# Patient Record
Sex: FEMALE | Race: BLACK OR AFRICAN AMERICAN | Employment: UNEMPLOYED | URBAN - METROPOLITAN AREA
[De-identification: names, ages, dates, MRNs, and addresses within clinical notes are randomized per-mention and may not be internally consistent; named-entity substitution may affect disease eponyms.]

---

## 2022-12-26 ENCOUNTER — HOSPITAL ENCOUNTER (EMERGENCY)
Age: 32
Discharge: LEFT BEFORE DISCHARGE | End: 2022-12-26
Attending: EMERGENCY MEDICINE

## 2022-12-26 VITALS
HEART RATE: 71 BPM | SYSTOLIC BLOOD PRESSURE: 137 MMHG | DIASTOLIC BLOOD PRESSURE: 85 MMHG | RESPIRATION RATE: 18 BRPM | TEMPERATURE: 97.8 F | OXYGEN SATURATION: 99 %

## 2022-12-26 PROCEDURE — 75810000275 HC EMERGENCY DEPT VISIT NO LEVEL OF CARE

## 2022-12-26 NOTE — ED TRIAGE NOTES
TRIAGE NOTE:  Patient arrives ambulatory with c/o right eye redness on Friday and reports was having pain in right eye yesterday.

## 2023-02-28 ENCOUNTER — HOSPITAL ENCOUNTER (EMERGENCY)
Age: 33
Discharge: HOME | End: 2023-02-28
Attending: EMERGENCY MEDICINE
Payer: MEDICAID

## 2023-02-28 ENCOUNTER — APPOINTMENT (OUTPATIENT)
Dept: CT IMAGING | Age: 33
End: 2023-02-28
Attending: NURSE PRACTITIONER
Payer: MEDICAID

## 2023-02-28 VITALS
HEART RATE: 97 BPM | DIASTOLIC BLOOD PRESSURE: 80 MMHG | OXYGEN SATURATION: 98 % | TEMPERATURE: 98.7 F | SYSTOLIC BLOOD PRESSURE: 124 MMHG | RESPIRATION RATE: 16 BRPM

## 2023-02-28 DIAGNOSIS — L02.91 ABSCESS: Primary | ICD-10-CM

## 2023-02-28 LAB
ALBUMIN SERPL-MCNC: 3.7 G/DL (ref 3.5–5)
ALBUMIN/GLOB SERPL: 1 (ref 1.1–2.2)
ALP SERPL-CCNC: 100 U/L (ref 45–117)
ALT SERPL-CCNC: 14 U/L (ref 12–78)
ANION GAP SERPL CALC-SCNC: 5 MMOL/L (ref 5–15)
AST SERPL-CCNC: <3 U/L (ref 15–37)
BASOPHILS # BLD: 0 K/UL (ref 0–0.1)
BASOPHILS NFR BLD: 0 % (ref 0–1)
BILIRUB SERPL-MCNC: 0.8 MG/DL (ref 0.2–1)
BUN SERPL-MCNC: 7 MG/DL (ref 6–20)
BUN/CREAT SERPL: 12 (ref 12–20)
CALCIUM SERPL-MCNC: 9.4 MG/DL (ref 8.5–10.1)
CHLORIDE SERPL-SCNC: 102 MMOL/L (ref 97–108)
CO2 SERPL-SCNC: 28 MMOL/L (ref 21–32)
CREAT SERPL-MCNC: 0.59 MG/DL (ref 0.55–1.02)
DIFFERENTIAL METHOD BLD: ABNORMAL
EOSINOPHIL # BLD: 0.1 K/UL (ref 0–0.4)
EOSINOPHIL NFR BLD: 1 % (ref 0–7)
ERYTHROCYTE [DISTWIDTH] IN BLOOD BY AUTOMATED COUNT: 12.5 % (ref 11.5–14.5)
GLOBULIN SER CALC-MCNC: 3.7 G/DL (ref 2–4)
GLUCOSE SERPL-MCNC: 242 MG/DL (ref 65–100)
HCG SERPL QL: NEGATIVE
HCT VFR BLD AUTO: 41 % (ref 35–47)
HGB BLD-MCNC: 13.1 G/DL (ref 11.5–16)
IMM GRANULOCYTES # BLD AUTO: 0 K/UL (ref 0–0.04)
IMM GRANULOCYTES NFR BLD AUTO: 0 % (ref 0–0.5)
LYMPHOCYTES # BLD: 2.2 K/UL (ref 0.8–3.5)
LYMPHOCYTES NFR BLD: 22 % (ref 12–49)
MCH RBC QN AUTO: 30.5 PG (ref 26–34)
MCHC RBC AUTO-ENTMCNC: 32 G/DL (ref 30–36.5)
MCV RBC AUTO: 95.6 FL (ref 80–99)
MONOCYTES # BLD: 1.1 K/UL (ref 0–1)
MONOCYTES NFR BLD: 11 % (ref 5–13)
NEUTS SEG # BLD: 6.6 K/UL (ref 1.8–8)
NEUTS SEG NFR BLD: 66 % (ref 32–75)
NRBC # BLD: 0 K/UL (ref 0–0.01)
NRBC BLD-RTO: 0 PER 100 WBC
PLATELET # BLD AUTO: 324 K/UL (ref 150–400)
PMV BLD AUTO: 10.1 FL (ref 8.9–12.9)
POTASSIUM SERPL-SCNC: 3.6 MMOL/L (ref 3.5–5.1)
PROT SERPL-MCNC: 7.4 G/DL (ref 6.4–8.2)
RBC # BLD AUTO: 4.29 M/UL (ref 3.8–5.2)
SODIUM SERPL-SCNC: 135 MMOL/L (ref 136–145)
WBC # BLD AUTO: 10 K/UL (ref 3.6–11)

## 2023-02-28 PROCEDURE — 75810000462 HC INC/DRN PILONIDAL CYST SIMPLE LVL 1 5051

## 2023-02-28 PROCEDURE — 99284 EMERGENCY DEPT VISIT MOD MDM: CPT

## 2023-02-28 PROCEDURE — 85025 COMPLETE CBC W/AUTO DIFF WBC: CPT

## 2023-02-28 PROCEDURE — 96375 TX/PRO/DX INJ NEW DRUG ADDON: CPT

## 2023-02-28 PROCEDURE — 74011000250 HC RX REV CODE- 250: Performed by: NURSE PRACTITIONER

## 2023-02-28 PROCEDURE — 36415 COLL VENOUS BLD VENIPUNCTURE: CPT

## 2023-02-28 PROCEDURE — 84703 CHORIONIC GONADOTROPIN ASSAY: CPT

## 2023-02-28 PROCEDURE — 96374 THER/PROPH/DIAG INJ IV PUSH: CPT

## 2023-02-28 PROCEDURE — 74011250636 HC RX REV CODE- 250/636: Performed by: NURSE PRACTITIONER

## 2023-02-28 PROCEDURE — 80053 COMPREHEN METABOLIC PANEL: CPT

## 2023-02-28 RX ORDER — KETOROLAC TROMETHAMINE 10 MG/1
10 TABLET, FILM COATED ORAL
Qty: 20 TABLET | Refills: 0 | Status: SHIPPED | OUTPATIENT
Start: 2023-02-28 | End: 2023-03-05

## 2023-02-28 RX ORDER — LIDOCAINE HYDROCHLORIDE AND EPINEPHRINE 10; 10 MG/ML; UG/ML
1.5 INJECTION, SOLUTION INFILTRATION; PERINEURAL
Status: COMPLETED | OUTPATIENT
Start: 2023-02-28 | End: 2023-02-28

## 2023-02-28 RX ORDER — HYDROMORPHONE HYDROCHLORIDE 1 MG/ML
1 INJECTION, SOLUTION INTRAMUSCULAR; INTRAVENOUS; SUBCUTANEOUS ONCE
Status: COMPLETED | OUTPATIENT
Start: 2023-02-28 | End: 2023-02-28

## 2023-02-28 RX ORDER — MORPHINE SULFATE 2 MG/ML
2 INJECTION, SOLUTION INTRAMUSCULAR; INTRAVENOUS ONCE
Status: COMPLETED | OUTPATIENT
Start: 2023-02-28 | End: 2023-02-28

## 2023-02-28 RX ORDER — DOXYCYCLINE HYCLATE 100 MG
100 TABLET ORAL 2 TIMES DAILY
Qty: 14 TABLET | Refills: 0 | Status: SHIPPED | OUTPATIENT
Start: 2023-02-28 | End: 2023-03-07

## 2023-02-28 RX ADMIN — MORPHINE SULFATE 2 MG: 2 INJECTION, SOLUTION INTRAMUSCULAR; INTRAVENOUS at 13:08

## 2023-02-28 RX ADMIN — LIDOCAINE HYDROCHLORIDE,EPINEPHRINE BITARTRATE 15 MG: 10; .01 INJECTION, SOLUTION INFILTRATION; PERINEURAL at 14:33

## 2023-02-28 RX ADMIN — HYDROMORPHONE HYDROCHLORIDE 1 MG: 1 INJECTION, SOLUTION INTRAMUSCULAR; INTRAVENOUS; SUBCUTANEOUS at 14:33

## 2023-02-28 NOTE — Clinical Note
[address]  [address] [facility] [address]  [phone]    Work/School Note    Date: 2/28/2023    To Whom It May concern:    Lester Villela was seen and treated today in the emergency room by the following provider(s):  Attending Provider: Codi Barger MD  Nurse Practitioner: Natalee Marlow NP. Lester Villela is excused from work/school on 2/28/2023 through 3/2/2023. She is medically clear to return to work/school on 3/3/2023.          Sincerely,          Andre Olivera NP

## 2023-02-28 NOTE — DISCHARGE INSTRUCTIONS
You were seen in the emergency department today for an abscess to your low back. I have the attached information for 2 general surgeons, please reach out to 1 of these practices to schedule a follow-up appointment. You should also follow-up with your primary care provider in the next couple of days. You can take over the over-the-counter medications that we discussed for your symptoms. Return if you develop any difficulty breathing, chest pain, or any other new or concerning symptoms. Today you have been prescribed antibiotic doxycycline. It Is important that when you take your doxycycline you take it with a full glass of water and do not lie down for 1 hour after taking. Doxycycline can also increase your sensitivity to the sun and make you more prone to sunburn. During this time please try to avoid prolonged direct contact with sunlight-this includes being outdoors, and take care even when driving in the car. Protective measures should be taken such as wearing sunscreen/a hat while you are on this medication    Today you have been prescribed a medication Toradol (ketorolac) please do not combine this medication with other anti-inflammatory such as ibuprofen, Motrin, Advil, Aleve, Naprosyn, naproxen, and other than aspirin containing products such as Goody powder, BC powder, Excedrin Migraine.   Please do not  take this medication  for more than 5 days

## 2023-02-28 NOTE — ED TRIAGE NOTES
Pt arrives from home for abscess to area above buttocks, first noticed pain 3 days ago. Pain 9/10. PMH of thigh abscess previously and is borderline diabetic but not on meds.

## 2023-02-28 NOTE — ED PROVIDER NOTES
HPI   Patient is a 29 y.o. F who presents today with complaints of abcess. Patient denies any chronic health conditions or daily medications. Patient states that she first noticed a lesion in associated pain about 3 days ago. She states that it is is a general soreness that radiates across and up and down her low back, with episodes of severe sharpness. It is both painful with standing and sitting and finds that it affects her her ability to walk comfortably, although she was able to. Denies fevers, chills, night sweats. States the pain is severe and she is not able to sleep at night. Denies changes or incontinence of bowel or bladder. Denies injury or scratches to the area. She has tried Tylenol, lidocaine patches and an antibiotic ointment with pain relief that she states have not been helpful. States she has previously had an abscess to her thigh and this feels similar to that to her. There are no other complaints, changes or physical findings at this time. Smoking: Denies  Alcohol: Denies  Drug Use: Denies  ALLERGIES: Patient has no known allergies. No past medical history on file. No past surgical history on file. No family history on file.   Social History     Socioeconomic History    Marital status: SINGLE     Spouse name: Not on file    Number of children: Not on file    Years of education: Not on file    Highest education level: Not on file   Occupational History    Not on file   Tobacco Use    Smoking status: Never    Smokeless tobacco: Not on file   Substance and Sexual Activity    Alcohol use: No    Drug use: No    Sexual activity: Not on file   Other Topics Concern    Not on file   Social History Narrative    Not on file     Social Determinants of Health     Financial Resource Strain: Not on file   Food Insecurity: Not on file   Transportation Needs: Not on file   Physical Activity: Not on file   Stress: Not on file   Social Connections: Not on file   Intimate Partner Violence: Not on file   Housing Stability: Not on file           Review of Systems   Constitutional:  Negative for fever. HENT:  Negative for congestion. Eyes:  Negative for discharge. Respiratory:  Negative for shortness of breath. Cardiovascular:  Negative for chest pain. Gastrointestinal:  Negative for nausea. Genitourinary:  Negative for dysuria. Musculoskeletal:  Negative for myalgias. Skin:  Positive for wound. Neurological:  Negative for seizures. Psychiatric/Behavioral:  Negative for behavioral problems. Vitals:    02/28/23 1227   BP: 124/80   Pulse: 97   Resp: 16   Temp: 98.7 °F (37.1 °C)   SpO2: 98%            Physical Exam  Constitutional:       Appearance: Normal appearance. HENT:      Head: Normocephalic and atraumatic. Eyes:      Pupils: Pupils are equal, round, and reactive to light. Cardiovascular:      Rate and Rhythm: Normal rate and regular rhythm. Pulmonary:      Effort: Pulmonary effort is normal.   Abdominal:      General: Abdomen is flat. Musculoskeletal:      Cervical back: Neck supple. Skin:     General: Skin is dry. Findings: Lesion present. Comments: Patient with circular area of erythematous skin with cellulitic changes to sacral area, significantly tender to touch, induration can be palpated both across and up and below the lesion. Middle of the lesion with fluctuation. No bullae, or drainage noted please see below photo. Induration ends at black dots noted   Neurological:      General: No focal deficit present. Mental Status: She is alert and oriented to person, place, and time. Cranial Nerves: No cranial nerve deficit. Motor: No weakness. Gait: Gait abnormal (Gait slightly altered, patient states this is due to discomfort). Comments: 5 out of 5 strength in bilateral lower extremities, denies numbness and tingling. Able to ambulate through emergency department although finds it painful.    Psychiatric:         Mood and Affect: Mood normal.         Behavior: Behavior normal.              LABORATORY RESULTS:  Recent Results (from the past 24 hour(s))   CBC WITH AUTOMATED DIFF    Collection Time: 02/28/23  1:04 PM   Result Value Ref Range    WBC 10.0 3.6 - 11.0 K/uL    RBC 4.29 3.80 - 5.20 M/uL    HGB 13.1 11.5 - 16.0 g/dL    HCT 41.0 35.0 - 47.0 %    MCV 95.6 80.0 - 99.0 FL    MCH 30.5 26.0 - 34.0 PG    MCHC 32.0 30.0 - 36.5 g/dL    RDW 12.5 11.5 - 14.5 %    PLATELET 086 503 - 261 K/uL    MPV 10.1 8.9 - 12.9 FL    NRBC 0.0 0  WBC    ABSOLUTE NRBC 0.00 0.00 - 0.01 K/uL    NEUTROPHILS 66 32 - 75 %    LYMPHOCYTES 22 12 - 49 %    MONOCYTES 11 5 - 13 %    EOSINOPHILS 1 0 - 7 %    BASOPHILS 0 0 - 1 %    IMMATURE GRANULOCYTES 0 0.0 - 0.5 %    ABS. NEUTROPHILS 6.6 1.8 - 8.0 K/UL    ABS. LYMPHOCYTES 2.2 0.8 - 3.5 K/UL    ABS. MONOCYTES 1.1 (H) 0.0 - 1.0 K/UL    ABS. EOSINOPHILS 0.1 0.0 - 0.4 K/UL    ABS. BASOPHILS 0.0 0.0 - 0.1 K/UL    ABS. IMM. GRANS. 0.0 0.00 - 0.04 K/UL    DF AUTOMATED     METABOLIC PANEL, COMPREHENSIVE    Collection Time: 02/28/23  1:04 PM   Result Value Ref Range    Sodium 135 (L) 136 - 145 mmol/L    Potassium 3.6 3.5 - 5.1 mmol/L    Chloride 102 97 - 108 mmol/L    CO2 28 21 - 32 mmol/L    Anion gap 5 5 - 15 mmol/L    Glucose 242 (H) 65 - 100 mg/dL    BUN 7 6 - 20 MG/DL    Creatinine 0.59 0.55 - 1.02 MG/DL    BUN/Creatinine ratio 12 12 - 20      eGFR >60 >60 ml/min/1.73m2    Calcium 9.4 8.5 - 10.1 MG/DL    Bilirubin, total 0.8 0.2 - 1.0 MG/DL    ALT (SGPT) 14 12 - 78 U/L    AST (SGOT) <3 (L) 15 - 37 U/L    Alk. phosphatase 100 45 - 117 U/L    Protein, total 7.4 6.4 - 8.2 g/dL    Albumin 3.7 3.5 - 5.0 g/dL    Globulin 3.7 2.0 - 4.0 g/dL    A-G Ratio 1.0 (L) 1.1 - 2.2     HCG QL SERUM    Collection Time: 02/28/23  1:04 PM   Result Value Ref Range    HCG, Ql. Negative NEG         IMAGING RESULTS:  No results found.     MEDICATIONS GIVEN:  Medications   iopamidoL (ISOVUE-370) 370 mg iodine /mL (76 %) injection 100 mL ( IntraVENous Canceled Entry 2/28/23 1306)   morphine injection 2 mg (2 mg IntraVENous Given 2/28/23 1308)   lidocaine-EPINEPHrine (XYLOCAINE) 1 %-1:100,000 injection 15 mg (15 mg IntraDERMal Given 2/28/23 1433)   HYDROmorphone (DILAUDID) injection 1 mg (1 mg IntraVENous Given 2/28/23 1433)          Medical Decision Making  This patient presents with a painful fluid pocket with fluctuance and surrounding induration and erythema just above the gluteal cleft concerning for pilonidal cyst. The abscess was anesthetized with lidocaine and then I&D was performed with deloculation and purulence was expressed. There is no lymphangitic spread visible. I did consider CT pelvis with contrast to better evaluate lesion, but after examination and discussion with attending of record, will hold on imaging studies at this time as this is the first occurrence of pilonidal cyst, and patient does not have any red flag symptoms such as fevers, chills, bullae or pain out of proportion. Likelihood of osteomyelitis is low as patient denies systemic symptoms of infection such as fevers or chill  Patient is not immunocompromised, and there is no bullae, pain out of proportion, or rapid progression concerning for necrotizing fasciitis. Patient to be discharged home with doxycycline with follow up with their PMD and general surgery      Amount and/or Complexity of Data Reviewed  Labs: ordered. Radiology: ordered. Risk  Prescription drug management.       I&D ABCESS COMP/MULTIPLE    Date/Time: 2/28/2023 4:23 PM  Performed by: Kari Mckeon NP  Authorized by: Kari Mckeon NP     Consent:     Consent obtained:  Verbal    Consent given by:  Patient    Risks, benefits, and alternatives were discussed: yes      Risks discussed:  Bleeding, incomplete drainage, pain and infection    Alternatives discussed:  No treatment  Universal protocol:     Procedure explained and questions answered to patient or proxy's satisfaction: yes      Relevant documents present and verified: yes      Required blood products, implants, devices, and special equipment available: yes      Site/side marked: yes      Immediately prior to procedure, a time out was called: yes      Patient identity confirmed:  Verbally with patient and arm band  Location:     Type:  Pilonidal cyst  Pre-procedure details:     Skin preparation:  Povidone-iodine  Sedation:     Sedation type:  None  Anesthesia:     Anesthesia method:  Local infiltration  Procedure type:     Complexity:  Complex  Procedure details:     Ultrasound guidance: no      Incision types:  Stab incision    Incision depth:  Dermal    Wound management:  Probed and deloculated and irrigated with saline    Drainage:  Purulent, bloody and serosanguinous    Drainage amount: Moderate    Wound treatment:  Wound left open    Packing materials:  None  Post-procedure details:     Procedure completion:  Tolerated well, no immediate complications  Comments:      Procedure supervised by attending of record        Presentation, management, and disposition were discussed with the attending physician, Dr. Jamel Blanco, who is in agreement with plan of care and has seen the patient. Dr Jamel Blanco also observed the incision and drainage performed by myself. Discussed results and work-up with patient and answered all questions, the patient expresses understanding and agrees with the care plan and disposition. The patient was given an opportunity to ask questions and all concerns raised were addressed prior to discharge. Recommended patient follow-up with provider as listed below. Counseled patient on standard home and self-care measures. Specifically explained the emergent conditions that could arise and clearly instructed the patient to return to the emergency department for those and any other new, worsening, or concerning symptoms. Patient stable and ready for discharge. IMPRESSION:  1.  Abscess DISPOSITION:  Discharge    PLAN:  Follow-up Information       Follow up With Specialties Details Why Contact Info    [address] DEP Emergency Medicine  As needed, If symptoms worsen [address] [facility]  [phone]    Your primary care doctor    Please call today to set up a follow-up appointment for wound check    [address]   If you do not have a primary care doctor this is who I recommend [phone] [facility],[address]    [address] [facility] Emergency Medicine   [phone]  [address] [facility] General Surgery  Schedule an appointment as soon as possible for a visit   [phone]  [facility] Surgery  Schedule an appointment as soon as possible for a visit   [address]  [phone]          Discharge Medication List as of 2/28/2023  3:10 PM        START taking these medications    Details   doxycycline (VIBRA-TABS) 100 mg tablet Take 1 Tablet by mouth two (2) times a day for 7 days. , Normal, Disp-14 Tablet, R-0      ketorolac (TORADOL) 10 mg tablet Take 1 Tablet by mouth every six (6) hours as needed for Pain for up to 5 days. , Normal, Disp-20 Tablet, R-0               Please note that this dictation was completed with [employer], the [employer] voice recognition software. Quite often unanticipated grammatical, syntax, homophones, and other interpretive errors are inadvertently transcribed by the computer software. Please disregard these errors. Please excuse any errors that have escaped final proofreading.